# Patient Record
Sex: FEMALE | Race: BLACK OR AFRICAN AMERICAN
[De-identification: names, ages, dates, MRNs, and addresses within clinical notes are randomized per-mention and may not be internally consistent; named-entity substitution may affect disease eponyms.]

---

## 2019-09-10 ENCOUNTER — HOSPITAL ENCOUNTER (INPATIENT)
Dept: HOSPITAL 35 - ER | Age: 54
LOS: 2 days | Discharge: HOME | DRG: 917 | End: 2019-09-12
Attending: HOSPITALIST | Admitting: HOSPITALIST
Payer: COMMERCIAL

## 2019-09-10 VITALS — SYSTOLIC BLOOD PRESSURE: 124 MMHG | DIASTOLIC BLOOD PRESSURE: 62 MMHG

## 2019-09-10 VITALS — DIASTOLIC BLOOD PRESSURE: 59 MMHG | SYSTOLIC BLOOD PRESSURE: 159 MMHG

## 2019-09-10 VITALS — WEIGHT: 226.6 LBS | HEIGHT: 62.99 IN | BODY MASS INDEX: 40.15 KG/M2

## 2019-09-10 VITALS — DIASTOLIC BLOOD PRESSURE: 60 MMHG | SYSTOLIC BLOOD PRESSURE: 116 MMHG

## 2019-09-10 VITALS — DIASTOLIC BLOOD PRESSURE: 79 MMHG | SYSTOLIC BLOOD PRESSURE: 157 MMHG

## 2019-09-10 VITALS — SYSTOLIC BLOOD PRESSURE: 133 MMHG | DIASTOLIC BLOOD PRESSURE: 76 MMHG

## 2019-09-10 VITALS — SYSTOLIC BLOOD PRESSURE: 116 MMHG | DIASTOLIC BLOOD PRESSURE: 60 MMHG

## 2019-09-10 VITALS — DIASTOLIC BLOOD PRESSURE: 117 MMHG | SYSTOLIC BLOOD PRESSURE: 154 MMHG

## 2019-09-10 DIAGNOSIS — E11.65: ICD-10-CM

## 2019-09-10 DIAGNOSIS — J68.0: ICD-10-CM

## 2019-09-10 DIAGNOSIS — I48.91: ICD-10-CM

## 2019-09-10 DIAGNOSIS — J96.21: ICD-10-CM

## 2019-09-10 DIAGNOSIS — E66.01: ICD-10-CM

## 2019-09-10 DIAGNOSIS — Z79.899: ICD-10-CM

## 2019-09-10 DIAGNOSIS — E83.42: ICD-10-CM

## 2019-09-10 DIAGNOSIS — F17.210: ICD-10-CM

## 2019-09-10 DIAGNOSIS — E78.5: ICD-10-CM

## 2019-09-10 DIAGNOSIS — I10: ICD-10-CM
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
## 2019-09-10 DIAGNOSIS — E87.6: ICD-10-CM

## 2019-09-10 DIAGNOSIS — J44.0: ICD-10-CM

## 2019-09-10 DIAGNOSIS — Z90.710: ICD-10-CM

## 2019-09-10 DIAGNOSIS — T58.91XA: Primary | ICD-10-CM

## 2019-09-10 DIAGNOSIS — Z79.1: ICD-10-CM

## 2019-09-10 LAB
ALBUMIN SERPL-MCNC: 3.5 G/DL (ref 3.4–5)
ALT SERPL-CCNC: 22 U/L (ref 30–65)
ANION GAP SERPL CALC-SCNC: 13 MMOL/L (ref 7–16)
AST SERPL-CCNC: 14 U/L (ref 15–37)
BASOPHILS NFR BLD AUTO: 0.5 % (ref 0–2)
BE(VIVO): 2.2 MMOL/L
BILIRUB SERPL-MCNC: 0.7 MG/DL
BUN SERPL-MCNC: 23 MG/DL (ref 7–18)
CALCIUM SERPL-MCNC: 9.4 MG/DL (ref 8.5–10.1)
CHLORIDE SERPL-SCNC: 100 MMOL/L (ref 98–107)
CO2 SERPL-SCNC: 25 MMOL/L (ref 21–32)
CREAT SERPL-MCNC: 1.1 MG/DL (ref 0.6–1)
EOSINOPHIL NFR BLD: 2.4 % (ref 0–3)
ERYTHROCYTE [DISTWIDTH] IN BLOOD BY AUTOMATED COUNT: 13.5 % (ref 10.5–14.5)
GLUCOSE SERPL-MCNC: 385 MG/DL (ref 74–106)
GRANULOCYTES NFR BLD MANUAL: 71.7 % (ref 36–66)
HCO3 BLD-SCNC: 27.3 MMOL/L (ref 22–26)
HCT VFR BLD CALC: 42 % (ref 37–47)
HGB BLD-MCNC: 13.4 GM/DL (ref 12–15)
LYMPHOCYTES NFR BLD AUTO: 22.3 % (ref 24–44)
MAGNESIUM SERPL-MCNC: 1.6 MG/DL (ref 1.8–2.4)
MCH RBC QN AUTO: 29 PG (ref 26–34)
MCHC RBC AUTO-ENTMCNC: 32 G/DL (ref 28–37)
MCV RBC: 90.6 FL (ref 80–100)
MONOCYTES NFR BLD: 3.1 % (ref 1–8)
NEUTROPHILS # BLD: 5.9 THOU/UL (ref 1.4–8.2)
PCO2 BLD: 44 MMHG (ref 35–45)
PLATELET # BLD: 225 THOU/UL (ref 150–400)
PO2 BLD: 59 MMHG (ref 80–100)
POTASSIUM SERPL-SCNC: 3.4 MMOL/L (ref 3.5–5.1)
PROT SERPL-MCNC: 6.9 G/DL (ref 6.4–8.2)
RBC # BLD AUTO: 4.64 MIL/UL (ref 4.2–5)
SODIUM SERPL-SCNC: 138 MMOL/L (ref 136–145)
TROPONIN I SERPL-MCNC: <0.06 NG/ML (ref ?–0.06)
WBC # BLD AUTO: 8.3 THOU/UL (ref 4–11)

## 2019-09-10 PROCEDURE — 5A09357 ASSISTANCE WITH RESPIRATORY VENTILATION, LESS THAN 24 CONSECUTIVE HOURS, CONTINUOUS POSITIVE AIRWAY PRESSURE: ICD-10-PCS | Performed by: NURSE PRACTITIONER

## 2019-09-10 PROCEDURE — 10084: CPT

## 2019-09-10 PROCEDURE — 10183: CPT

## 2019-09-10 NOTE — NUR
PT ARRIVED FROM THE ER @0400. A&OX4. ON ASSESSEDMENT WHEEZING NOTED IN LUNGS.
C/O OF PAIN IN LEFT EXT MEDS GIVEN SEE EMAR. ADM ASSESSMENT DOCUMENT, ORIENTED
PT TO THE ROOM. IV INTACT IN RT A/C AND FLUIDS INFUSING. WILL CONT POC TILL
EOS.

## 2019-09-10 NOTE — NUR
Chart reviewed and assessment completed with the pt at bedside. Cm role
introduced. Pt lives with her fiance in an apt. She works, drives and is indep
with gait and adl's. She has health insurance through her employer and a pcp
in place for f/u care. She denies any dc needs or concerns. No cm
interventions identified at this time. Will remain available should needs
arise.

## 2019-09-11 VITALS — DIASTOLIC BLOOD PRESSURE: 78 MMHG | SYSTOLIC BLOOD PRESSURE: 133 MMHG

## 2019-09-11 VITALS — DIASTOLIC BLOOD PRESSURE: 56 MMHG | SYSTOLIC BLOOD PRESSURE: 126 MMHG

## 2019-09-11 VITALS — SYSTOLIC BLOOD PRESSURE: 152 MMHG | DIASTOLIC BLOOD PRESSURE: 80 MMHG

## 2019-09-11 VITALS — SYSTOLIC BLOOD PRESSURE: 136 MMHG | DIASTOLIC BLOOD PRESSURE: 56 MMHG

## 2019-09-11 PROCEDURE — 5A09357 ASSISTANCE WITH RESPIRATORY VENTILATION, LESS THAN 24 CONSECUTIVE HOURS, CONTINUOUS POSITIVE AIRWAY PRESSURE: ICD-10-PCS | Performed by: NURSE PRACTITIONER

## 2019-09-11 NOTE — NUR
PATIENT ALERT AND ORIENTED X4. UP ADLIB IN ROOM. BS MONTIORED PER ORDER. IVF
DISCONTINUED ON AM SHIFT. IV TEAM WILL BE CALLED TO RESTART IV AS PATIENT IS A
DIFFICULT STICK. C/O PAIN AND MEDICATED WITH TYLENOL WITH GOOD RESULTS. SLEPT
WITH CPAP. WILL MONITOR.

## 2019-09-11 NOTE — NUR
ASSUMED PT CARE REPORT RECEIVED FROM NURSE, PT IS AOX4. ON 1 L NC. VSS.
MEDICATION RECEIVED THIS AM. ACCUCHECK. INSULIN GIVEN. PT COMPLAINS THAT " I
WAS NOT GIVEN MY SOLUMEDROL LAST NIGHT, I DO NOT HAVE AN IV LINE.". THIS NURSE
EXPLAINS TO PATIENT THAT THERE IS NO SOLUMEDROL ORDER ON THE COMPUTER. THIS
NURSE CONTACTED IV TEAM TO INSERT AN IV IN PATIENT WHO IS A "HARD STICK". PT
HAS NO OTHER COMPLAINT. UP AD [place]. NANDINI CONTINUE TO MONITOR

## 2019-09-12 VITALS — SYSTOLIC BLOOD PRESSURE: 117 MMHG | DIASTOLIC BLOOD PRESSURE: 80 MMHG

## 2019-09-12 VITALS — SYSTOLIC BLOOD PRESSURE: 130 MMHG | DIASTOLIC BLOOD PRESSURE: 75 MMHG

## 2019-09-12 LAB
EST. AVERAGE GLUCOSE BLD GHB EST-MCNC: 280 MG/DL
GLYCOHEMOGLOBIN (HGB A1C): 11.4 % (ref 4.8–5.6)

## 2019-09-12 NOTE — NUR
DC ORDERS RECEIVED. IV REMOVED R UA. DC INSTRUCTIONS, SCRIPTS AND F/U APPOINT
REVIEWED WITH PT. VOLUNTEER ESCORTED PT TO MAIN ENTRANCE.

## 2019-09-12 NOTE — NUR
ASSUMED CARE OF PT @1900 PT ASSESSED AT START OF SHIFT A&OX4 WITH CONCERNS OF
BLOOD SUGAR. ON HOURLY INSULIN CHECK AND INSULIN DRIP INFUSING IN RT UPPER
ARM SEE EMAR AND REVIEW FOR DOCUMENTATION. UP AD [place] TO THE BATHROOM AND
CALLS APPROPRIATELY. PAIN MEDS GIVEN FOR LEFT SIDE PAIN SEE EMAR. GETS
BREATHING TX @NIGHT AND CPAP BY BEDSIDE AT NIGHT. WILL CONT WITH POC TILL EOS.